# Patient Record
Sex: FEMALE | Race: WHITE | NOT HISPANIC OR LATINO | Employment: FULL TIME | URBAN - METROPOLITAN AREA
[De-identification: names, ages, dates, MRNs, and addresses within clinical notes are randomized per-mention and may not be internally consistent; named-entity substitution may affect disease eponyms.]

---

## 2018-03-09 ENCOUNTER — TRANSCRIBE ORDERS (OUTPATIENT)
Dept: ADMINISTRATIVE | Age: 48
End: 2018-03-09

## 2018-03-09 ENCOUNTER — APPOINTMENT (OUTPATIENT)
Dept: LAB | Age: 48
End: 2018-03-09
Payer: COMMERCIAL

## 2018-03-09 DIAGNOSIS — R03.0 ELEVATED BLOOD PRESSURE READING WITHOUT DIAGNOSIS OF HYPERTENSION: ICD-10-CM

## 2018-03-09 DIAGNOSIS — R03.0 ELEVATED BLOOD PRESSURE READING WITHOUT DIAGNOSIS OF HYPERTENSION: Primary | ICD-10-CM

## 2018-03-09 DIAGNOSIS — E78.5 HYPERLIPIDEMIA, UNSPECIFIED HYPERLIPIDEMIA TYPE: ICD-10-CM

## 2018-03-09 DIAGNOSIS — R50.2 IODIDE FEVER IN THERAPEUTIC USE: ICD-10-CM

## 2018-03-09 DIAGNOSIS — T49.0X5A IODIDE FEVER IN THERAPEUTIC USE: ICD-10-CM

## 2018-03-09 LAB
ALBUMIN SERPL BCP-MCNC: 3.6 G/DL (ref 3.5–5)
ALP SERPL-CCNC: 72 U/L (ref 46–116)
ALT SERPL W P-5'-P-CCNC: 32 U/L (ref 12–78)
ANION GAP SERPL CALCULATED.3IONS-SCNC: 7 MMOL/L (ref 4–13)
AST SERPL W P-5'-P-CCNC: 17 U/L (ref 5–45)
BILIRUB SERPL-MCNC: 0.73 MG/DL (ref 0.2–1)
BUN SERPL-MCNC: 6 MG/DL (ref 5–25)
CALCIUM SERPL-MCNC: 8.9 MG/DL (ref 8.3–10.1)
CHLORIDE SERPL-SCNC: 105 MMOL/L (ref 100–108)
CHOLEST SERPL-MCNC: 189 MG/DL (ref 50–200)
CO2 SERPL-SCNC: 28 MMOL/L (ref 21–32)
CREAT SERPL-MCNC: 0.69 MG/DL (ref 0.6–1.3)
CREAT UR-MCNC: 362 MG/DL
GFR SERPL CREATININE-BSD FRML MDRD: 103 ML/MIN/1.73SQ M
GLUCOSE P FAST SERPL-MCNC: 99 MG/DL (ref 65–99)
HDLC SERPL-MCNC: 67 MG/DL (ref 40–60)
LDLC SERPL CALC-MCNC: 103 MG/DL (ref 0–100)
POTASSIUM SERPL-SCNC: 3.9 MMOL/L (ref 3.5–5.3)
PROT SERPL-MCNC: 7.1 G/DL (ref 6.4–8.2)
PROT UR-MCNC: 28 MG/DL
PROT/CREAT UR: 0.08 MG/G{CREAT} (ref 0–0.1)
SODIUM SERPL-SCNC: 140 MMOL/L (ref 136–145)
TRIGL SERPL-MCNC: 97 MG/DL
TSH SERPL DL<=0.05 MIU/L-ACNC: 2.48 UIU/ML (ref 0.36–3.74)

## 2018-03-09 PROCEDURE — 80061 LIPID PANEL: CPT

## 2018-03-09 PROCEDURE — 84443 ASSAY THYROID STIM HORMONE: CPT

## 2018-03-09 PROCEDURE — 82570 ASSAY OF URINE CREATININE: CPT | Performed by: FAMILY MEDICINE

## 2018-03-09 PROCEDURE — 36415 COLL VENOUS BLD VENIPUNCTURE: CPT

## 2018-03-09 PROCEDURE — 84156 ASSAY OF PROTEIN URINE: CPT | Performed by: FAMILY MEDICINE

## 2018-03-09 PROCEDURE — 80053 COMPREHEN METABOLIC PANEL: CPT

## 2019-04-30 ENCOUNTER — OFFICE VISIT (OUTPATIENT)
Dept: OBGYN CLINIC | Facility: CLINIC | Age: 49
End: 2019-04-30
Payer: COMMERCIAL

## 2019-04-30 VITALS
BODY MASS INDEX: 23.39 KG/M2 | WEIGHT: 132 LBS | HEIGHT: 63 IN | SYSTOLIC BLOOD PRESSURE: 140 MMHG | DIASTOLIC BLOOD PRESSURE: 88 MMHG

## 2019-04-30 DIAGNOSIS — Z01.419 ENCNTR FOR GYN EXAM (GENERAL) (ROUTINE) W/O ABN FINDINGS: ICD-10-CM

## 2019-04-30 DIAGNOSIS — Z12.31 ENCOUNTER FOR SCREENING MAMMOGRAM FOR MALIGNANT NEOPLASM OF BREAST: ICD-10-CM

## 2019-04-30 PROCEDURE — S0610 ANNUAL GYNECOLOGICAL EXAMINA: HCPCS | Performed by: PHYSICIAN ASSISTANT

## 2019-04-30 PROCEDURE — G0145 SCR C/V CYTO,THINLAYER,RESCR: HCPCS | Performed by: PHYSICIAN ASSISTANT

## 2019-04-30 PROCEDURE — 87624 HPV HI-RISK TYP POOLED RSLT: CPT | Performed by: PHYSICIAN ASSISTANT

## 2019-05-02 LAB
HPV HR 12 DNA CVX QL NAA+PROBE: NEGATIVE
HPV16 DNA CVX QL NAA+PROBE: NEGATIVE
HPV18 DNA CVX QL NAA+PROBE: NEGATIVE

## 2019-05-03 LAB
LAB AP GYN PRIMARY INTERPRETATION: NORMAL
Lab: NORMAL

## 2019-07-31 ENCOUNTER — TRANSCRIBE ORDERS (OUTPATIENT)
Dept: ADMINISTRATIVE | Age: 49
End: 2019-07-31

## 2019-07-31 ENCOUNTER — TELEPHONE (OUTPATIENT)
Dept: OBGYN CLINIC | Facility: CLINIC | Age: 49
End: 2019-07-31

## 2019-07-31 ENCOUNTER — APPOINTMENT (OUTPATIENT)
Dept: LAB | Age: 49
End: 2019-07-31
Payer: COMMERCIAL

## 2019-07-31 DIAGNOSIS — Z00.00 ENCOUNTER FOR ANNUAL GENERAL MEDICAL EXAMINATION WITHOUT ABNORMAL FINDINGS IN ADULT: ICD-10-CM

## 2019-07-31 DIAGNOSIS — R03.0 ELEVATED BLOOD PRESSURE READING WITHOUT DIAGNOSIS OF HYPERTENSION: ICD-10-CM

## 2019-07-31 DIAGNOSIS — R03.0 ELEVATED BLOOD PRESSURE READING WITHOUT DIAGNOSIS OF HYPERTENSION: Primary | ICD-10-CM

## 2019-07-31 LAB
ANION GAP SERPL CALCULATED.3IONS-SCNC: 3 MMOL/L (ref 4–13)
BUN SERPL-MCNC: 10 MG/DL (ref 5–25)
CALCIUM SERPL-MCNC: 9.2 MG/DL (ref 8.3–10.1)
CHLORIDE SERPL-SCNC: 108 MMOL/L (ref 100–108)
CHOLEST SERPL-MCNC: 194 MG/DL (ref 50–200)
CO2 SERPL-SCNC: 30 MMOL/L (ref 21–32)
CREAT SERPL-MCNC: 0.64 MG/DL (ref 0.6–1.3)
GFR SERPL CREATININE-BSD FRML MDRD: 104 ML/MIN/1.73SQ M
GLUCOSE P FAST SERPL-MCNC: 99 MG/DL (ref 65–99)
HDLC SERPL-MCNC: 77 MG/DL (ref 40–60)
LDLC SERPL CALC-MCNC: 101 MG/DL (ref 0–100)
NONHDLC SERPL-MCNC: 117 MG/DL
POTASSIUM SERPL-SCNC: 4.1 MMOL/L (ref 3.5–5.3)
SODIUM SERPL-SCNC: 141 MMOL/L (ref 136–145)
TRIGL SERPL-MCNC: 79 MG/DL

## 2019-07-31 PROCEDURE — 80061 LIPID PANEL: CPT

## 2019-07-31 PROCEDURE — 80048 BASIC METABOLIC PNL TOTAL CA: CPT

## 2019-07-31 PROCEDURE — 36415 COLL VENOUS BLD VENIPUNCTURE: CPT

## 2019-07-31 NOTE — TELEPHONE ENCOUNTER
Patient called - state she was told by Carmel Floor that if she had any spotting to call  Please call pt back

## 2019-08-02 NOTE — TELEPHONE ENCOUNTER
Pt returned call and stated lmp was over a year ago and was told by chana to call in so that it noted in her chart if she has any bleeding, pt stated had light spotting, color dark brown red, was using a liner - lasted til 08/01 for 4 days

## 2019-08-06 NOTE — TELEPHONE ENCOUNTER
At her yearly visit in April, she told us she had a period on 8/5/18     Please bring her in for a visit with possible endometrial biopsy - have her premedicate with ibuprofen 600mg with a snack one hour prior to her visit (as long as she can take ibuprofen)

## 2019-08-06 NOTE — TELEPHONE ENCOUNTER
Patient returned call - she is aware that she needs to come in for an OV and she may have EB done  Recommended she take 2-3 ibuprofen an hour before the appt  She is schedueld for 08/16

## 2020-07-01 ENCOUNTER — ANNUAL EXAM (OUTPATIENT)
Dept: OBGYN CLINIC | Facility: CLINIC | Age: 50
End: 2020-07-01
Payer: COMMERCIAL

## 2020-07-01 VITALS
BODY MASS INDEX: 24.45 KG/M2 | SYSTOLIC BLOOD PRESSURE: 138 MMHG | WEIGHT: 138 LBS | HEIGHT: 63 IN | TEMPERATURE: 98.7 F | DIASTOLIC BLOOD PRESSURE: 82 MMHG

## 2020-07-01 DIAGNOSIS — Z12.31 ENCOUNTER FOR SCREENING MAMMOGRAM FOR MALIGNANT NEOPLASM OF BREAST: ICD-10-CM

## 2020-07-01 DIAGNOSIS — Z01.419 ENCNTR FOR GYN EXAM (GENERAL) (ROUTINE) W/O ABN FINDINGS: Primary | ICD-10-CM

## 2020-07-01 DIAGNOSIS — Z12.11 COLON CANCER SCREENING: ICD-10-CM

## 2020-07-01 PROCEDURE — 99396 PREV VISIT EST AGE 40-64: CPT | Performed by: PHYSICIAN ASSISTANT

## 2020-07-01 NOTE — PROGRESS NOTES
Lida Blanton  1970      CC:  Yearly exam    S:  44 y o  female here for yearly exam  Her cycles are spacing out to every 4-8 months  Sexual activity: She is sexually active without pain, bleeding or dryness  Contraception: She uses condoms for contraception  She is struggling with anxiety and sleep and is not treating this  Last Pap 4/30/19 neg/neg  Last Mammo never  Last Colonoscopy never    We reviewed ASCCP guidelines for Pap testing today  Family hx of breast cancer: no  Family hx of ovarian cancer:no  Family hx of colon cancer: no    No current outpatient medications on file    Social History     Socioeconomic History    Marital status: /Civil Union     Spouse name: Not on file    Number of children: Not on file    Years of education: Not on file    Highest education level: Not on file   Occupational History    Not on file   Social Needs    Financial resource strain: Not on file    Food insecurity:     Worry: Not on file     Inability: Not on file    Transportation needs:     Medical: Not on file     Non-medical: Not on file   Tobacco Use    Smoking status: Current Every Day Smoker    Smokeless tobacco: Never Used   Substance and Sexual Activity    Alcohol use: Yes     Frequency: Never     Drinks per session: 1 or 2     Binge frequency: Never    Drug use: Never    Sexual activity: Yes     Partners: Male     Birth control/protection: None, Condom Male   Lifestyle    Physical activity:     Days per week: Not on file     Minutes per session: Not on file    Stress: Not on file   Relationships    Social connections:     Talks on phone: Not on file     Gets together: Not on file     Attends Worship service: Not on file     Active member of club or organization: Not on file     Attends meetings of clubs or organizations: Not on file     Relationship status: Not on file    Intimate partner violence:     Fear of current or ex partner: Not on file     Emotionally abused: Not on file     Physically abused: Not on file     Forced sexual activity: Not on file   Other Topics Concern    Not on file   Social History Narrative    Not on file     Family History   Problem Relation Age of Onset    No Known Problems Mother     Diabetes Son     Liver cancer Maternal Grandfather     No Known Problems Son     Diabetes Maternal Uncle       Past Medical History:   Diagnosis Date    Urinary tract infection     Varicella         Review of Systems   Respiratory: Negative  Cardiovascular: Negative  Gastrointestinal: Negative for constipation and diarrhea  Genitourinary: Negative for difficulty urinating, pelvic pain, vaginal bleeding, vaginal discharge, itching or odor  O:  Blood pressure 138/82, temperature 98 7 °F (37 1 °C), temperature source Tympanic, height 5' 2 99" (1 6 m), weight 62 6 kg (138 lb), last menstrual period 04/07/2020  Patient appears well and is not in distress  Neck is supple without masses  Breasts are symmetrical without mass, tenderness, nipple discharge, skin changes or adenopathy  Abdomen is soft and nontender without masses  External genitals are normal without lesions or rashes  Urethral meatus and urethra are normal  Bladder is normal to palpation  Vagina is normal without discharge or bleeding  Cervix is normal without discharge or lesion  Uterus is normal, mobile, nontender without palpable mass  Adnexa are normal, nontender, without palpable mass  A:  Yearly exam      P:   Pap 2024    Mammo slip provided    Colonoscopy recommended    RTO one year for yearly exam or sooner as needed

## 2020-09-22 ENCOUNTER — TELEPHONE (OUTPATIENT)
Dept: SURGERY | Facility: CLINIC | Age: 50
End: 2020-09-22

## 2020-09-22 DIAGNOSIS — Z12.11 SCREENING FOR COLON CANCER: Primary | ICD-10-CM

## 2020-09-22 NOTE — TELEPHONE ENCOUNTER
Called and spoke to patient regarding her colon cancer screening options  She chose Cologuard  (order entered and faxed)    She is requesting return call from Zulma Bal and/or team - regarding questions/concerns she has about menopause  Thank you

## 2020-09-23 NOTE — TELEPHONE ENCOUNTER
She had no issues when I saw her for her yearly  She needs an appt to address this - preferably with her PCP, as none of this sounds GYN in origin to me

## 2020-09-23 NOTE — TELEPHONE ENCOUNTER
Pt returned call and stated I quote "everything in her lower intestines are messed up and her blood pressure is up and she would like to know if related to menopause can hysterectomy be an option for discussion to solve issues  Pt further more added she is having hot flashes and mood swings that make her angry with dizziness at random times of the day  Please advise

## 2020-10-23 ENCOUNTER — TELEPHONE (OUTPATIENT)
Dept: SURGERY | Facility: CLINIC | Age: 50
End: 2020-10-23

## 2021-03-27 ENCOUNTER — IMMUNIZATIONS (OUTPATIENT)
Dept: FAMILY MEDICINE CLINIC | Facility: HOSPITAL | Age: 51
End: 2021-03-27

## 2021-03-27 DIAGNOSIS — Z23 ENCOUNTER FOR IMMUNIZATION: Primary | ICD-10-CM

## 2021-03-27 PROCEDURE — 91300 SARS-COV-2 / COVID-19 MRNA VACCINE (PFIZER-BIONTECH) 30 MCG: CPT

## 2021-03-27 PROCEDURE — 0001A SARS-COV-2 / COVID-19 MRNA VACCINE (PFIZER-BIONTECH) 30 MCG: CPT

## 2021-04-17 ENCOUNTER — IMMUNIZATIONS (OUTPATIENT)
Dept: FAMILY MEDICINE CLINIC | Facility: HOSPITAL | Age: 51
End: 2021-04-17

## 2021-04-17 DIAGNOSIS — Z23 ENCOUNTER FOR IMMUNIZATION: Primary | ICD-10-CM

## 2021-04-17 PROCEDURE — 91300 SARS-COV-2 / COVID-19 MRNA VACCINE (PFIZER-BIONTECH) 30 MCG: CPT

## 2021-04-17 PROCEDURE — 0002A SARS-COV-2 / COVID-19 MRNA VACCINE (PFIZER-BIONTECH) 30 MCG: CPT

## 2021-07-06 ENCOUNTER — ANNUAL EXAM (OUTPATIENT)
Dept: OBGYN CLINIC | Facility: CLINIC | Age: 51
End: 2021-07-06
Payer: COMMERCIAL

## 2021-07-06 VITALS
BODY MASS INDEX: 25.37 KG/M2 | DIASTOLIC BLOOD PRESSURE: 70 MMHG | SYSTOLIC BLOOD PRESSURE: 128 MMHG | WEIGHT: 143.2 LBS | HEIGHT: 63 IN

## 2021-07-06 DIAGNOSIS — Z01.419 ENCNTR FOR GYN EXAM (GENERAL) (ROUTINE) W/O ABN FINDINGS: Primary | ICD-10-CM

## 2021-07-06 DIAGNOSIS — R10.2 PELVIC PAIN: ICD-10-CM

## 2021-07-06 DIAGNOSIS — Z12.31 ENCOUNTER FOR SCREENING MAMMOGRAM FOR MALIGNANT NEOPLASM OF BREAST: ICD-10-CM

## 2021-07-06 PROBLEM — I10 ESSENTIAL HYPERTENSION: Status: ACTIVE | Noted: 2021-01-08

## 2021-07-06 PROBLEM — I51.9 DIASTOLIC DYSFUNCTION, LEFT VENTRICLE: Status: ACTIVE | Noted: 2020-09-03

## 2021-07-06 PROBLEM — Z72.0 TOBACCO ABUSE: Status: ACTIVE | Noted: 2021-01-08

## 2021-07-06 PROCEDURE — 3008F BODY MASS INDEX DOCD: CPT | Performed by: PHYSICIAN ASSISTANT

## 2021-07-06 PROCEDURE — 99396 PREV VISIT EST AGE 40-64: CPT | Performed by: PHYSICIAN ASSISTANT

## 2021-07-06 RX ORDER — LOSARTAN POTASSIUM 25 MG/1
TABLET ORAL
COMMUNITY
Start: 2021-06-08

## 2021-07-06 RX ORDER — CHLORAL HYDRATE 500 MG
1000 CAPSULE ORAL DAILY
COMMUNITY

## 2021-07-06 NOTE — PROGRESS NOTES
Brian Patino   1970    CC:  Yearly exam    S:  46 y o  female here for yearly exam  She is postmenopausal (LMP 4/2020) and has had no vaginal bleeding  She denies vaginal discharge, itching, odor or dryness  She gets occasional left pelvic cramping discomfort unrelated to bowels or bladder  Sexual activity: She is sexually active without pain, bleeding or dryness  Last Pap: 4/30/19 neg/neg  Last Mammo:  never  Last Colonoscopy:  Cologuard 10/2020 neg      We reviewed ASCCP guidelines for Pap testing  Family hx of breast cancer: no  Family hx of ovarian cancer: no  Family hx of colon cancer: no      Current Outpatient Medications:     calcium-vitamin D (OSCAL) 250-125 MG-UNIT per tablet, Take 1 tablet by mouth daily, Disp: , Rfl:     losartan (COZAAR) 25 mg tablet, , Disp: , Rfl:     Omega-3 Fatty Acids (fish oil) 1,000 mg, Take 1,000 mg by mouth daily, Disp: , Rfl:     SUPER B COMPLEX/C PO, Take by mouth, Disp: , Rfl:   Social History     Socioeconomic History    Marital status: /Civil Union     Spouse name: Not on file    Number of children: Not on file    Years of education: Not on file    Highest education level: Not on file   Occupational History    Not on file   Tobacco Use    Smoking status: Current Every Day Smoker    Smokeless tobacco: Never Used   Vaping Use    Vaping Use: Never used   Substance and Sexual Activity    Alcohol use: Yes    Drug use: Never    Sexual activity: Yes     Partners: Male     Birth control/protection: None, Post-menopausal   Other Topics Concern    Not on file   Social History Narrative    Not on file     Social Determinants of Health     Financial Resource Strain:     Difficulty of Paying Living Expenses:    Food Insecurity:     Worried About Running Out of Food in the Last Year:     [address] in the Last Year:    Transportation Needs:     Lack of Transportation (Medical):      Lack of Transportation (Non-Medical):    Physical Activity:     Days of Exercise per Week:     Minutes of Exercise per Session:    Stress:     Feeling of Stress :    Social Connections:     Frequency of Communication with Friends and Family:     Frequency of Social Gatherings with Friends and Family:     Attends Protestant Services:     Active Member of Clubs or Organizations:     Attends Club or Organization Meetings:     Marital Status:    Intimate Partner Violence:     Fear of Current or Ex-Partner:     Emotionally Abused:     Physically Abused:     Sexually Abused:      Family History   Problem Relation Age of Onset    No Known Problems Mother     Diabetes Son     Liver cancer Maternal Grandfather     No Known Problems Son     Diabetes Maternal Uncle      Past Medical History:   Diagnosis Date    Urinary tract infection     Varicella         Review of Systems   Respiratory: Negative  Cardiovascular: Negative  Gastrointestinal: Negative for constipation and diarrhea  Genitourinary: Negative for difficulty urinating, pelvic pain, vaginal bleeding, vaginal discharge, itching or odor  O:  Blood pressure 128/70, height 5' 2 99" (1 6 m), weight 65 kg (143 lb 3 2 oz), last menstrual period 04/07/2020  Patient appears well and is not in distress  Neck is supple without masses  Breasts are symmetrical without mass, tenderness, nipple discharge, skin changes or adenopathy  Abdomen is soft and nontender without masses  External genitals are normal without lesions or rashes  Urethral meatus and urethra are normal  Bladder is normal to palpation  Vagina is normal without discharge or bleeding  Cervix is normal without discharge or lesion  Uterus is normal, mobile, nontender without palpable mass  Adnexa are normal, nontender, without palpable mass       A:   Yearly exam     Left pelvic discomfort - suspect GI origin    P:   Pap 2024   Mammo slip given   Colonoscopy 2023   Check pelvic US, if neg, refer to GI     RTO one year for yearly exam or sooner as needed

## 2021-07-15 ENCOUNTER — HOSPITAL ENCOUNTER (OUTPATIENT)
Dept: RADIOLOGY | Age: 51
Discharge: HOME | End: 2021-07-15
Payer: COMMERCIAL

## 2021-07-15 DIAGNOSIS — R10.2 PELVIC PAIN: ICD-10-CM

## 2021-07-15 PROCEDURE — 76856 US EXAM PELVIC COMPLETE: CPT

## 2021-07-15 PROCEDURE — 76830 TRANSVAGINAL US NON-OB: CPT

## 2021-09-08 ENCOUNTER — TELEPHONE (OUTPATIENT)
Dept: GYNECOLOGY | Facility: CLINIC | Age: 51
End: 2021-09-08

## 2021-11-30 ENCOUNTER — HOSPITAL ENCOUNTER (OUTPATIENT)
Dept: RADIOLOGY | Age: 51
Discharge: HOME | End: 2021-11-30
Payer: COMMERCIAL

## 2021-11-30 VITALS — HEIGHT: 63 IN | BODY MASS INDEX: 25.34 KG/M2 | WEIGHT: 143 LBS

## 2021-11-30 DIAGNOSIS — Z12.31 ENCOUNTER FOR SCREENING MAMMOGRAM FOR MALIGNANT NEOPLASM OF BREAST: ICD-10-CM

## 2021-11-30 PROCEDURE — 77067 SCR MAMMO BI INCL CAD: CPT

## 2021-11-30 PROCEDURE — 77063 BREAST TOMOSYNTHESIS BI: CPT

## 2021-12-06 ENCOUNTER — OFFICE VISIT (OUTPATIENT)
Dept: GYNECOLOGY | Facility: CLINIC | Age: 51
End: 2021-12-06
Payer: COMMERCIAL

## 2021-12-06 VITALS
HEART RATE: 63 BPM | BODY MASS INDEX: 24.98 KG/M2 | DIASTOLIC BLOOD PRESSURE: 78 MMHG | WEIGHT: 141 LBS | SYSTOLIC BLOOD PRESSURE: 118 MMHG | HEIGHT: 63 IN

## 2021-12-06 DIAGNOSIS — Z78.0 MENOPAUSE: Primary | ICD-10-CM

## 2021-12-06 DIAGNOSIS — F17.200 SMOKING: ICD-10-CM

## 2021-12-06 PROCEDURE — 99202 OFFICE O/P NEW SF 15 MIN: CPT | Performed by: OBSTETRICS & GYNECOLOGY

## 2021-12-06 RX ORDER — SACCHAROMYCES BOULARDII 250 MG
250 CAPSULE ORAL 2 TIMES DAILY
COMMUNITY
End: 2022-07-08 | Stop reason: ALTCHOICE

## 2021-12-06 RX ORDER — VANCOMYCIN HYDROCHLORIDE 125 MG/1
CAPSULE ORAL
COMMUNITY
Start: 2021-11-04 | End: 2022-07-08 | Stop reason: ALTCHOICE

## 2022-03-01 ENCOUNTER — TELEPHONE (OUTPATIENT)
Dept: GYNECOLOGY | Facility: CLINIC | Age: 52
End: 2022-03-01

## 2022-07-08 ENCOUNTER — ANNUAL EXAM (OUTPATIENT)
Dept: OBGYN CLINIC | Facility: CLINIC | Age: 52
End: 2022-07-08
Payer: COMMERCIAL

## 2022-07-08 VITALS
WEIGHT: 145.8 LBS | BODY MASS INDEX: 24.89 KG/M2 | HEIGHT: 64 IN | DIASTOLIC BLOOD PRESSURE: 78 MMHG | SYSTOLIC BLOOD PRESSURE: 122 MMHG

## 2022-07-08 DIAGNOSIS — Z01.419 ENCNTR FOR GYN EXAM (GENERAL) (ROUTINE) W/O ABN FINDINGS: ICD-10-CM

## 2022-07-08 DIAGNOSIS — Z12.31 ENCOUNTER FOR SCREENING MAMMOGRAM FOR MALIGNANT NEOPLASM OF BREAST: ICD-10-CM

## 2022-07-08 PROBLEM — A04.72 C. DIFFICILE DIARRHEA: Status: ACTIVE | Noted: 2022-03-01

## 2022-07-08 PROBLEM — A04.72 C. DIFFICILE DIARRHEA: Status: RESOLVED | Noted: 2022-03-01 | Resolved: 2022-07-08

## 2022-07-08 PROCEDURE — 0503F POSTPARTUM CARE VISIT: CPT | Performed by: PHYSICIAN ASSISTANT

## 2022-07-08 PROCEDURE — 99396 PREV VISIT EST AGE 40-64: CPT | Performed by: PHYSICIAN ASSISTANT

## 2022-07-08 RX ORDER — AMLODIPINE BESYLATE 2.5 MG/1
2.5 TABLET ORAL DAILY
COMMUNITY
Start: 2022-05-03

## 2022-07-08 NOTE — PROGRESS NOTES
Manju Prince   1970    CC:  Yearly exam    S:  45 y o  female here for yearly exam  She is postmenopausal (4/2020) and has had no vaginal bleeding  She denies vaginal discharge, itching, odor or dryness  Sexual activity: She is sexually active with some dryness but no bleeding       Last Pap: 4/30/19 neg/neg  Last Mammo: 11/30/21 neg   Last Colonoscopy: 12/20/21      We reviewed [facility] guidelines for Pap testing       Family hx of breast cancer: no  Family hx of ovarian cancer: no  Family hx of colon cancer: no      Current Outpatient Medications:     calcium-vitamin D (OSCAL) 250-125 MG-UNIT per tablet, Take 1 tablet by mouth daily, Disp: , Rfl:     losartan (COZAAR) 25 mg tablet, , Disp: , Rfl:     Omega-3 Fatty Acids (fish oil) 1,000 mg, Take 1,000 mg by mouth daily, Disp: , Rfl:     SUPER B COMPLEX/C PO, Take by mouth, Disp: , Rfl:     amLODIPine (NORVASC) 2 5 mg tablet, Take 2 5 mg by mouth daily, Disp: , Rfl:   Social History     Socioeconomic History    Marital status: /Civil Union     Spouse name: Not on file    Number of children: Not on file    Years of education: Not on file    Highest education level: Not on file   Occupational History    Not on file   Tobacco Use    Smoking status: Current Every Day Smoker     Packs/day: 0 50     Years: 25 00     Pack years: 12 50     Types: Cigarettes    Smokeless tobacco: Never Used   Vaping Use    Vaping Use: Never used   Substance and Sexual Activity    Alcohol use:  Yes    Drug use: Never    Sexual activity: Yes     Partners: Male     Birth control/protection: None, Post-menopausal   Other Topics Concern    Not on file   Social History Narrative    Not on file     Social Determinants of Health     Financial Resource Strain: Not on file   Food Insecurity: Not on file   Transportation Needs: Not on file   Physical Activity: Not on file   Stress: Not on file   Social Connections: Not on file   Intimate Partner Violence: Not on file Housing Stability: Not on file     Family History   Problem Relation Age of Onset    Glaucoma Mother     No Known Problems Father     Diabetes Son     Diabetes type I Son     Liver cancer Maternal Grandfather     No Known Problems Son     Diabetes Maternal Uncle     Throat cancer Maternal Grandmother     Arthritis Family      Past Medical History:   Diagnosis Date    C  difficile colitis     Urinary tract infection     Varicella         Review of Systems   Respiratory: Negative  Cardiovascular: Negative  Gastrointestinal: Negative for constipation and diarrhea  Genitourinary: Negative for difficulty urinating, pelvic pain, vaginal bleeding, vaginal discharge, itching or odor  O:  Blood pressure 122/78, height 5' 4 17" (1 63 m), weight 66 1 kg (145 lb 12 8 oz), last menstrual period 04/07/2020  Patient appears well and is not in distress  Neck is supple without masses  Breasts are symmetrical without mass, tenderness, nipple discharge, skin changes or adenopathy  Abdomen is soft and nontender without masses  External genitals are normal without lesions or rashes  Urethral meatus and urethra are normal  Bladder is normal to palpation  Vagina is normal without discharge or bleeding  Cervix is normal without discharge or lesion  Uterus is normal, mobile, nontender without palpable mass  Adnexa are normal, nontender, without palpable mass  A:   Yearly exam      P:   Pap 2024  Mammo slip given   Colonoscopy due 2026   Samples of Uberlube silicone lubricant given    RTO one year for yearly exam or sooner as needed

## 2023-11-22 ENCOUNTER — ANNUAL EXAM (OUTPATIENT)
Dept: OBGYN CLINIC | Facility: CLINIC | Age: 53
End: 2023-11-22
Payer: COMMERCIAL

## 2023-11-22 VITALS
WEIGHT: 146 LBS | HEIGHT: 63 IN | BODY MASS INDEX: 25.87 KG/M2 | SYSTOLIC BLOOD PRESSURE: 110 MMHG | DIASTOLIC BLOOD PRESSURE: 80 MMHG

## 2023-11-22 DIAGNOSIS — Z12.31 ENCOUNTER FOR SCREENING MAMMOGRAM FOR MALIGNANT NEOPLASM OF BREAST: ICD-10-CM

## 2023-11-22 DIAGNOSIS — Z01.419 ENCNTR FOR GYN EXAM (GENERAL) (ROUTINE) W/O ABN FINDINGS: Primary | ICD-10-CM

## 2023-11-22 PROCEDURE — G0476 HPV COMBO ASSAY CA SCREEN: HCPCS | Performed by: PHYSICIAN ASSISTANT

## 2023-11-22 PROCEDURE — 99396 PREV VISIT EST AGE 40-64: CPT | Performed by: PHYSICIAN ASSISTANT

## 2023-11-22 PROCEDURE — G0145 SCR C/V CYTO,THINLAYER,RESCR: HCPCS | Performed by: PHYSICIAN ASSISTANT

## 2023-11-22 NOTE — PROGRESS NOTES
Assessment   52 y.o. [id number] presenting for annual exam.     Plan   Diagnoses and all orders for this visit:    Encntr for gyn exam (general) (routine) w/o abn findings  -     Liquid-based pap, screening    Encounter for screening mammogram for malignant neoplasm of breast  -     Mammo screening bilateral w 3d & cad; Future        Pap collected today  Mammo slip given    Colonoscopy up to date    Perineal hygiene reviewed. Weight bearing exercises minium of 150 mins/weekly advised. Kegel exercises recommended. SBE encouraged, A yearly mammogram is recommended for breast cancer screening starting at age 36. ASCCP guidelines reviewed. Calcium/ Vit D dietary requirements discussed. Advised to call with any issues, all concerns & questions addressed. See provided information in your after visit summary     F/U Annually and PRN    Results will be released to [facility], if abnormal will call or message to review and discuss treatment plan.     __________________________________________________________________    Subjective     Zohra Lopez is a 52 y.o. [id number] presenting for annual exam. She is without complaint. SCREENING  Last Pap: 2019 neg/neg  Last Mammo: 2021 birads 1  Last Colonoscopy: 2021; 5 year recall      GYN  , no VB    Sexually active: Yes - single partner - male    Hx Abnormal pap: denies  We reviewed ASCCP guidelines for Pap testing today. Denies vaginal discharge, itching, odor, dyspareunia, pelvic pain and vulvar/vaginal symptoms      OB           Complaints: denies   Denies urgency, frequency, hematuria, leakage / change in stream, difficulty urinating.        BREAST  Complaints: denies   Denies: breast lump, breast tenderness, nipple discharge, skin color change, and skin lesion(s)  Personal hx: none      Pertinent Family Hx:   Family hx of breast cancer: no  Family hx of ovarian cancer: no  Family hx of colon cancer: no      GENERAL  PMH reviewed/updated and is as below. Patient does follow with a PCP. SOCIAL  Smokin.25 ppd  Alcohol: social  Drug: no  Occupation:  - [employer]      Past Medical History:   Diagnosis Date    C. difficile colitis     Urinary tract infection     Varicella        Past Surgical History:   Procedure Laterality Date     SECTION      COLONOSCOPY  2021    WISDOM TOOTH EXTRACTION           Current Outpatient Medications:     amLODIPine (NORVASC) 2.5 mg tablet, Take 2.5 mg by mouth daily, Disp: , Rfl:     calcium-vitamin D (OSCAL) 250-125 MG-UNIT per tablet, Take 1 tablet by mouth daily, Disp: , Rfl:     losartan (COZAAR) 25 mg tablet, , Disp: , Rfl:     Omega-3 Fatty Acids (fish oil) 1,000 mg, Take 1,000 mg by mouth daily, Disp: , Rfl:     SUPER B COMPLEX/C PO, Take by mouth, Disp: , Rfl:     Allergies   Allergen Reactions    Aspirin        Social History     Socioeconomic History    Marital status: /Civil Union     Spouse name: Not on file    Number of children: Not on file    Years of education: Not on file    Highest education level: Not on file   Occupational History    Not on file   Tobacco Use    Smoking status: Every Day     Packs/day: 0.25     Years: 20.00     Total pack years: 5.00     Types: Cigarettes    Smokeless tobacco: Never   Vaping Use    Vaping Use: Never used   Substance and Sexual Activity    Alcohol use:  Yes     Alcohol/week: 4.0 standard drinks of alcohol     Types: 4 Glasses of wine per week    Drug use: Never    Sexual activity: Yes     Partners: Male     Birth control/protection: None   Other Topics Concern    Not on file   Social History Narrative    Not on file     Social Determinants of Health     Financial Resource Strain: Not on file   Food Insecurity: Not on file   Transportation Needs: Not on file   Physical Activity: Not on file   Stress: Not on file   Social Connections: Not on file   Intimate Partner Violence: Not on file   Housing Stability: Not on file       Review of Systems     ROS:  Constitutional: Negative for fatigue and unexpected weight change. Respiratory: Negative for cough and shortness of breath. Cardiovascular: Negative for chest pain and palpitations. Gastrointestinal: Negative for abdominal pain and change in bowel habits  Breasts:  Negative, other than as noted above. Genitourinary: Negative, other than as noted above. Psychiatric: Negative for mood difficulties. Objective      /80 (BP Location: Left arm, Patient Position: Sitting, Cuff Size: Standard)   Ht 5' 3" (1.6 m)   Wt 66.2 kg (146 lb)   LMP 04/07/2020   BMI 25.86 kg/m²     Physical Examination:    Patient appears well and is not in distress  Neck is supple without masses, no cervical or supraclavicular lymphadenopathy  Cardiovascular: regular rate and rhythm; no murmurs  Lungs: clear to auscultation bilaterally; no wheezes  Breasts are symmetrical without mass, tenderness, nipple discharge, skin changes or adenopathy. Abdomen is soft and nontender without masses. External genitals are normal without lesions or rashes. Urethral meatus and urethra are normal  Bladder is normal to palpation  Vagina is normal without discharge or bleeding. Cervix is normal without discharge or lesion. Uterus is normal, mobile, nontender without palpable mass. Adnexa are normal, nontender, without palpable mass.

## 2023-11-22 NOTE — PROGRESS NOTES
Patient presents for a routine annual visit  PMB - none  Last Pap Smear- 2019  Pap today   Mammogram- 2021  Referral given   Colonoscopy- 2021   5 year recall   Cologard - 10/14/2020    smoker  Currently sexually active -one partner has some pain and dryness - uses lubricants   No family history of uterine, ovarian, cervical or breast cancer  No concerns/questions for today's visit

## 2023-11-30 LAB
LAB AP GYN PRIMARY INTERPRETATION: NORMAL
Lab: NORMAL

## 2024-07-01 DIAGNOSIS — Z00.6 ENCOUNTER FOR EXAMINATION FOR NORMAL COMPARISON OR CONTROL IN CLINICAL RESEARCH PROGRAM: ICD-10-CM

## 2024-10-15 ENCOUNTER — TELEPHONE (OUTPATIENT)
Dept: OTHER | Facility: HOSPITAL | Age: 54
End: 2024-10-15

## 2024-10-15 DIAGNOSIS — R89.8 ABNORMAL GENETIC TEST: Primary | ICD-10-CM

## 2024-10-15 NOTE — TELEPHONE ENCOUNTER
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 She would like a referral to a genetic counselor.

## 2024-10-16 ENCOUNTER — TELEPHONE (OUTPATIENT)
Dept: GENETICS | Facility: CLINIC | Age: 54
End: 2024-10-16

## 2024-10-16 NOTE — TELEPHONE ENCOUNTER
LVM asking Soha to please call [phone] to discuss her helix genetic test results.    Received notification from bedside RN about patient with regards to: increasing agitation, keeps trying to get out of bed and walk in the hallway, unable to be redirected  VS: /54, HR 58, RR 18, O2 sat 97% on RA    Intervention given:   - Atrax 25 mg PO x 1 dose

## 2024-10-22 ENCOUNTER — TELEPHONE (OUTPATIENT)
Dept: GENETICS | Facility: CLINIC | Age: 54
End: 2024-10-22

## 2024-10-22 NOTE — TELEPHONE ENCOUNTER
LVM asking Soha to please [facility] [phone] option 3 to discuss her [employer] genetic test results at her earliest convenience.

## 2024-10-23 ENCOUNTER — DOCUMENTATION (OUTPATIENT)
Dept: GENETICS | Facility: CLINIC | Age: 54
End: 2024-10-23

## 2024-10-25 ENCOUNTER — OFFICE VISIT (OUTPATIENT)
Dept: GENETICS | Facility: CLINIC | Age: 54
End: 2024-10-25

## 2024-10-25 DIAGNOSIS — Z15.01 BRCA2 POSITIVE: Primary | ICD-10-CM

## 2024-10-25 DIAGNOSIS — Z15.09 BRCA2 POSITIVE: Primary | ICD-10-CM

## 2024-10-25 NOTE — PROGRESS NOTES
Post-Test Genetic Counseling Consult Note    Patient Name: Soha Clifford   /Age: 1970/54 y.o.    Date of Service: 10/25/2024  Genetic Counselor: Lisa Perdomo MS, [facility] [place]  Interpretation Services: None  Location: Telephone consult   Length of Visit: 30 Minutes    Soha presents today for a consult regarding her [facility] test results.    Genetic Testing History:     Report Date: 10/14/2024     Test: [facility] Molecular Screen (11 genes): BRCA1, BRCA2, MLH1, MSH2, MSH6, PMS2, EPCAM, APOB, LDLR, LDLRAP1, and PCSK9       Result: Positive     BRCA2 [id number]); Heterozygous; Pathogenic      Relevant Family History   Patient reports non-Ashkenazi Hindu ancestry.     Maternal Family History:  Grandfather: liver cancer; WWII vet/malaria exposure (d.56); Soha confirmed this cancer is of liver primary and was not pancreatic primary with liver mets   Grandmother: ? Cancer diagnosed in 70s, no treatment (d.73)  Great-grandmother (grandmother's side): ovarian cancer diagnosed in 70s (d.70s)    Paternal Family History:  Soha had limited contact with her biological father and there is no information regarding their family history and structure     Please refer to the scanned pedigree in the Media Tab for a complete family history     *All history is reported as provided by the patient; records are not available for review, except where indicated.     [facility] Result:  Soha underwent genetic testing through the [facility] community health research program. As a part of this program, 11 genes associated with hereditary breast and ovarian cancer (HBOC) syndrome, Duval syndrome and familial hypercholesterolemia (FH) were tested. Soha's result returned positive for a pathogenic variant in the BRCA2 gene. During today's visit, Soha and I reviewed the cancer risks associated with her result, and the recommendations for screening and management.    Assessment:  The BRCA2 gene is  associated with autosomal dominant hereditary breast and ovarian cancer (HBOC) syndrome (MedGen UID: [id number]) and autosomal recessive Fanconi anemia.      This result is consistent with hereditary breast and ovarian cancer (HBOC) syndrome.      Hereditary breast and ovarian cancer (HBOC) syndrome  Women with a single BRCA2 pathogenic variant have approximately a 55-70% lifetime risk of breast cancer.  The risk for a second primary breast cancer within 20 years of the first diagnosis is between 25%. In premenopausal women, the risk for a second breast cancer within 15 years of the first diagnosis is >20%.       Women also have up to a 17-27% lifetime risk for ovarian, fallopian tube, or peritoneal cancer to age 70.      Men with a BRCA2 pathogenic variant have a 1.8-7.1% risk for breast cancer and a 19-61% risk for prostate cancer.      Men and women with a BRCA2 pathogenic variant have up to a 5-10% lifetime risk for pancreatic cancer. They also have an elevated risk for melanoma although the exact lifetime risk is not known.      Reproductive Risks:  If an individual and their partner both have a pathogenic variant in one copy of the BRCA2 gene, there is a 25% chance that a child would inherit a pathogenic variant in both copies of the BRCA2 gene.  Pathogenic variants in both copies of the BRCA2 gene cause a more severe genetic condition called Fanconi anemia.      Fanconi anemia is characterized by bone marrow failure, short stature, abnormal skin pigmentation, developmental delay and malformations of the thumbs, skeletal and central nervous systems.  Individuals with Fanconi anemia also have an increased risk for leukemia and early onset solid tumors.      Relatives of reproductive age may consider testing for the familial variant for reproductive purposes. We recommend that any of these relatives who test positive consider testing their partners. Carrier couples can consult a prenatal genetic counselor to discuss  their reproductive options.     Risks and Testing for Family Members:  This test result may help clarify the risk for other family members to develop cancer. There is a 50% chance all first-degree relatives (parents, siblings and children) inherited the BRCA2 pathogenic variant.  Other relatives such as aunts, uncles and cousins may also be at risk.      Since it is not known with one-hundred percent certainty which side of the family this BRCA2 pathogenic variant came from, we recommend that Soha share this test results with extended family members from both sides of her family.   Testing is not recommended for relatives under the age of 18, as there are no childhood cancer risks known to be associated with one BRCA2 pathogenic variant.     If Soha's relatives have any questions or are interested in testing they can reach out to the main Genetics number at [phone] for additional information.     Management:  Management guidelines for individuals with pathogenic and likely pathogenic BRCA2 variants have been developed by the National Comprehensive Cancer Network.  Please refer to the current NCCN guidelines for the most up to date guidelines (https://www.nccn.org/guidelines/category_2).  The recommendations listed below are specific to Soha and are are based on recommendations in the the NCCN guidelines as of 10/25/24.  These recommendations are subject to change over time and the newest guidelines should be referenced for the most up to date recommendations.       Plan:  WOMEN  Breast Cancer Screening  -Breast awareness starting at age 18 y  -Clinical breast exam every 6-12 mo, starting at age 25 y     -Age 25-29 y, annual breast MRI screening with and without contrast (or mammogram only if MRI is unavailable) or individualized based on family history if a breast cancer diagnosis before 30 is present.    -Age 30-75y, annual mammogram and breast MRI screening with contrast    -Age ?75 y, management  should be considered on an individual basis.    -Discuss option of risk-reducing mastectomy    -For women with a BRCA pathogenic/likely pathogenic variant who are treated for breast cancer and have not had a bilateral mastectomy, screening with annual mammogram and breast MRI should continue as described above.    Gynecologic Cancer Screening  Ovarian Cancer  -Recommend risk-reducing salpingo-oophorectomy (RRSO), typically between 35 and 40y, and upon completion of child bearing. See Risk-Reducing Salpingo-Oophorectomy (RRSO) Protocol in NCCN Guidelines for Ovarian Cancer - Principles of Surgery.    -Because onset of ovarian cancer in individuals with BRCA2 pathogenic/likely pathogenic variants is an average 8-10 years later than individuals with pathogenic/likely pathogenic BRCA1 variants it's reasonable to delay RRSO for management of ovarian cancer risk until 40-45y in patients with BRCA2 pathogenic/likely pathogenic variants unless age at diagnosis in the family warrants earlier age for consideration of prophylactic surgery.    -Salpingectomy alone is not the standard of care for risk reduction, although clinical trials of interval salpingectomy and delayed oophorectomy are ongoing. The concern for risk-reducing salpingectomy alone is that women are still at risk for developing ovarian cancer. In addition, in premenopausal women, oophorectomy likely reduces the risk of developing breast cancer but the magnitude is uncertain and may be gene-specific.    We offered a referral to gynecologic oncology but Soha declined. She would prefer to discuss her options with Melissa Lacy PA-C.     Skin Cancer Screening  -No specific screening guidelines exist for melanoma, but general melanoma risk management is appropriate, such as annual full-body skin examination and minimizing UV exposure.    Pancreatic Cancer Screening:  Per current NCCN Guidelines, all individuals with a BRCA2 mutation can consider pancreatic cancer  screening regardless of a family history of pancreatic cancer. Pancreatic cancer screening typically begins at age 50 (or 10 years younger than the earliest exocrine pancreatic cancer diagnosis in the family, whichever is earlier) and includes contrast-enhanced MRI/MRCP (magnetic resonance cholangiopancreatography) and/or endoscopic ultrasound (EU).     Other Screening:  There are no additional recommendations based on Soha's result.  She should continue cancer screening and medical management as clinically indicated and as determined appropriate by her healthcare providers.    Additional Information:  A healthy lifestyle will improve overall health and reduce risk for illness. Eating a healthy diet and exercising for 4 hours per week is recommended. Both diet and exercise have been shown to help maintain a healthy weight. Postmenopausal women who are overweight are at higher risk for breast cancer. Moderate to heavy alcohol use can increase the risk for some cancers. Smoking cigarettes can also increase risk for breast, lung, prostate, pancreatic and other cancers.      Additional Testing:  Soha and I reviewed that the testing performed in this study assessed for 7 cancer risk-related genes. Additional cancer risk genes are available for diagnostic testing. Based on Soha's reported personal history she does not meet additional criteria, and is not interested in further testing. I provided my office's contact information should there be any future questions or interest in pursuing additional testing.    Plan:  Referral placed to breast specialist in surgical oncology, GI, and dermatology.     Positive Result: Soha was strongly encouraged to follow up on with our office on an annual basis to review the most up to date guidelines as recommendations are subject to change over time.

## 2024-10-28 ENCOUNTER — TELEPHONE (OUTPATIENT)
Dept: HEMATOLOGY ONCOLOGY | Facility: CLINIC | Age: 54
End: 2024-10-28

## 2024-10-28 PROBLEM — Z15.01 BRCA2 POSITIVE: Status: ACTIVE | Noted: 2024-10-28

## 2024-10-28 PROBLEM — Z15.09 BRCA2 POSITIVE: Status: ACTIVE | Noted: 2024-10-28

## 2024-10-28 NOTE — TELEPHONE ENCOUNTER
Referral to Surgical Oncology received.  Chart reviewed by  for Surgical oncology at this time.       Diagnosis:   Diagnosis   Z15.01, Z15.09 (ICD-10-CM) - BRCA2 positive     After review of chart, instructions for scheduling added to referral and sent to be scheduled as advised.

## 2024-12-10 PROBLEM — Z91.89 AT HIGH RISK FOR BREAST CANCER: Status: ACTIVE | Noted: 2024-12-10

## 2025-01-03 ENCOUNTER — TELEPHONE (OUTPATIENT)
Dept: SURGICAL ONCOLOGY | Facility: CLINIC | Age: 55
End: 2025-01-03

## 2025-01-03 NOTE — TELEPHONE ENCOUNTER
Called patient and left a message to call the office back and let us know if she would like to reschedule her appointment with Dr. Castellon.

## 2025-01-21 ENCOUNTER — ANNUAL EXAM (OUTPATIENT)
Dept: OBGYN CLINIC | Facility: CLINIC | Age: 55
End: 2025-01-21
Payer: COMMERCIAL

## 2025-01-21 VITALS
BODY MASS INDEX: 25.54 KG/M2 | HEIGHT: 64 IN | WEIGHT: 149.6 LBS | SYSTOLIC BLOOD PRESSURE: 120 MMHG | DIASTOLIC BLOOD PRESSURE: 80 MMHG

## 2025-01-21 DIAGNOSIS — R01.1 HEART MURMUR: ICD-10-CM

## 2025-01-21 DIAGNOSIS — Z91.89 AT HIGH RISK FOR BREAST CANCER: ICD-10-CM

## 2025-01-21 DIAGNOSIS — Z15.01 BRCA2 POSITIVE: ICD-10-CM

## 2025-01-21 DIAGNOSIS — Z12.31 ENCOUNTER FOR SCREENING MAMMOGRAM FOR MALIGNANT NEOPLASM OF BREAST: ICD-10-CM

## 2025-01-21 DIAGNOSIS — Z01.419 WELL WOMAN EXAM WITH ROUTINE GYNECOLOGICAL EXAM: Primary | ICD-10-CM

## 2025-01-21 DIAGNOSIS — Z15.09 BRCA2 POSITIVE: ICD-10-CM

## 2025-01-21 PROCEDURE — 99396 PREV VISIT EST AGE 40-64: CPT | Performed by: PHYSICIAN ASSISTANT

## 2025-01-21 NOTE — PROGRESS NOTES
Assessment   54 y.o.  presenting for annual exam.     Plan   Diagnoses and all orders for this visit:    Well woman exam with routine gynecological exam    Encounter for screening mammogram for malignant neoplasm of breast  -     Mammo screening bilateral w 3d and cad; Future    BRCA2 positive  -     MRI breast bilateral w and wo contrast w cad; Future    At high risk for breast cancer  -     MRI breast bilateral w and wo contrast w cad; Future    Heart murmur  -     Echo complete congenital; Future         Postmenopausal symptoms - Considering consult with [facility]. Discussed option of in network hormonal consult. Encouraged her to consider non-hormonal options such as SSRIs/SNRIs, Veozah, and OTC supplements. Discussed the risks of Estrogen-containing medications/pellets given that she is BRCA2+ and smokes about 1/2 pack/day.     BRCA2+- Mammo slip given - patient encouraged to schedule mammogram ASAP due to BRCA2+ on genetic testing last year. Also given slip for MRI breast to be done 6 months after MRI. Referral to breast specialists in surgical oncology was given. Patient was encouraged to make an appointment to discuss her options and so she can receive clinical breast exams every 6 months.     Heart murmur - Slight murmur heard on physical exam today in office, last Echo was in  which was WNL. Echo slip was given to be completed in the near future.     Pap up to date   Colonoscopy due next year () per 5 year recall    Perineal hygiene reviewed. Weight bearing exercises minium of 150 mins/weekly advised. Kegel exercises recommended.   SBE encouraged, A yearly mammogram is recommended for breast cancer screening starting at age 40. ASCCP guidelines reviewed. Calcium/ Vit D dietary requirements discussed.   Advised to call with any issues, all concerns & questions addressed.   See provided information in your after visit summary     F/U Annually and PRN    Results will be released to  [facility], if abnormal will call or message to review and discuss treatment plan.     __________________________________________________________________    Mar Clifford is a 54 y.o.  presenting for annual exam. She is without complaint and does not want STD testing today.     She has some questions about the [facility] clinic. Says she is having some anxiety, tension headaches, stress, hot flashes. Says her hot flashes are sometimes very bad, but are not always severe. Has not taken any medications or OTC supplements other than Super B-complex vitamins. Says a friend of hers has gone to the clinic and had success.    Received genetic testing last year that showed that patient is positive for BRCA 2. She has no known family history of +BRCA2 or breast cancer. She had an appointment with [facility] [place] on 10/25/2024 where they discussed important follow up diagnostic tests and management. She seemed disinterested in meeting with the breast specialist in surgical oncology given that she has no issues at the moment.     SCREENING  Last Pap: 2023 neg/neg  Last Mammo: 2021 birads 1  Last Colonoscopy: 2021; 5 year recall      GYN  , no VB    Sexually active: Yes - single partner - male    Hx Abnormal pap: denies  We reviewed ASCCP guidelines for Pap testing today.    Denies vaginal discharge, itching, odor, dyspareunia, pelvic pain and vulvar/vaginal symptoms      OB           Complaints: denies   Denies urgency, frequency, hematuria, leakage / change in stream, difficulty urinating.       BREAST  Complaints: denies   Denies: breast lump, breast tenderness, nipple discharge, skin color change, and skin lesion(s)  Personal hx: BRCA2+      Pertinent Family Hx: Limited contact with biological father  Family hx of breast cancer: no  Family hx of ovarian cancer: no  Family hx of colon cancer: no      GENERAL  PMH reviewed/updated and is as  below.   Patient does follow with a PCP.    SOCIAL  Smokin/4 - 1/2 pack/day   Alcohol: None currently   Drug: Occasional THC   Occupation: [employer], on break right now      Past Medical History:   Diagnosis Date    BRCA positive     C. difficile colitis     Urinary tract infection     Varicella        Past Surgical History:   Procedure Laterality Date     SECTION      COLONOSCOPY  2021    WISDOM TOOTH EXTRACTION           Current Outpatient Medications:     amLODIPine (NORVASC) 2.5 mg tablet, Take 2.5 mg by mouth daily, Disp: , Rfl:     calcium-vitamin D (OSCAL) 250-125 MG-UNIT per tablet, Take 1 tablet by mouth daily, Disp: , Rfl:     losartan (COZAAR) 25 mg tablet, , Disp: , Rfl:     Omega-3 Fatty Acids (fish oil) 1,000 mg, Take 1,000 mg by mouth daily, Disp: , Rfl:     SUPER B COMPLEX/C PO, Take by mouth, Disp: , Rfl:     Allergies   Allergen Reactions    Aspirin        Social History     Socioeconomic History    Marital status: /Civil Union     Spouse name: Not on file    Number of children: Not on file    Years of education: Not on file    Highest education level: Not on file   Occupational History    Not on file   Tobacco Use    Smoking status: Every Day     Current packs/day: 0.25     Average packs/day: 0.3 packs/day for 20.0 years (5.0 ttl pk-yrs)     Types: Cigarettes    Smokeless tobacco: Never   Vaping Use    Vaping status: Never Used   Substance and Sexual Activity    Alcohol use: Yes     Alcohol/week: 4.0 standard drinks of alcohol     Types: 4 Glasses of wine per week     Comment: occasionally    Drug use: Never    Sexual activity: Yes     Partners: Male     Birth control/protection: None   Other Topics Concern    Not on file   Social History Narrative    Not on file     Social Drivers of Health     Financial Resource Strain: Not on file   Food Insecurity: Not on file   Transportation Needs: Not on file   Physical Activity: Not on file   Stress: Not  "on file   Social Connections: Not on file   Intimate Partner Violence: Not on file   Housing Stability: Not on file       Review of Systems     ROS:  Constitutional: Negative for fatigue and unexpected weight change.   Respiratory: Negative for cough and shortness of breath.    Cardiovascular: Negative for chest pain and palpitations.   Gastrointestinal: Negative for abdominal pain and change in bowel habits  Breasts:  Negative, other than as noted above.   Genitourinary: Negative, other than as noted above.   Psychiatric: Negative for mood difficulties.      Objective      /80 (BP Location: Left arm, Patient Position: Sitting, Cuff Size: Standard)   Ht 5' 3.5\" (1.613 m)   Wt 67.9 kg (149 lb 9.6 oz)   LMP 04/07/2020   BMI 26.08 kg/m²     Physical Examination:    Patient appears well and is not in distress  Neck is supple without masses, no cervical or supraclavicular lymphadenopathy  Cardiovascular: regular rate and rhythm. Grade II systolic murmur  Lungs: clear to auscultation bilaterally; no wheezes  Breasts are symmetrical without mass, tenderness, nipple discharge, skin changes or adenopathy.   Abdomen is soft and nontender without masses.   External genitals are normal without lesions or rashes.  Urethral meatus and urethra are normal  Bladder is normal to palpation  Vagina is normal without discharge or bleeding.   Cervix is normal without discharge or lesion.   Uterus is normal, mobile, nontender without palpable mass.  Adnexa are normal, nontender, without palpable mass.     History and physical exam performed by NURA Miles with direct supervision by me           "

## 2025-01-28 ENCOUNTER — TELEPHONE (OUTPATIENT)
Age: 55
End: 2025-01-28

## 2025-01-28 NOTE — TELEPHONE ENCOUNTER
Cassie MORRELL called asking for the order of the echo requested by Melissa Lacy to be faxed to Fax [phone]

## 2025-02-06 ENCOUNTER — RESULTS FOLLOW-UP (OUTPATIENT)
Dept: OBGYN CLINIC | Facility: MEDICAL CENTER | Age: 55
End: 2025-02-06

## 2025-02-07 NOTE — TELEPHONE ENCOUNTER
----- Message from Sugey Willard RN sent at 2/7/2025 11:45 AM EST -----  Ernie SUN    Clerical Team - please mail letter to patient regarding EKG results and follow recs. Thanks!

## 2025-05-01 ENCOUNTER — HOSPITAL ENCOUNTER (OUTPATIENT)
Dept: RADIOLOGY | Age: 55
Discharge: HOME | End: 2025-05-01
Payer: COMMERCIAL

## 2025-05-01 VITALS — HEIGHT: 64 IN | WEIGHT: 149 LBS | BODY MASS INDEX: 25.44 KG/M2

## 2025-05-01 DIAGNOSIS — Z12.31 ENCOUNTER FOR SCREENING MAMMOGRAM FOR MALIGNANT NEOPLASM OF BREAST: ICD-10-CM

## 2025-05-01 PROCEDURE — 77063 BREAST TOMOSYNTHESIS BI: CPT

## 2025-05-01 PROCEDURE — 77067 SCR MAMMO BI INCL CAD: CPT

## 2025-05-02 ENCOUNTER — RESULTS FOLLOW-UP (OUTPATIENT)
Age: 55
End: 2025-05-02

## 2025-05-17 ENCOUNTER — TELEPHONE (OUTPATIENT)
Dept: MAMMOGRAPHY | Facility: CLINIC | Age: 55
End: 2025-05-17

## 2025-05-17 NOTE — TELEPHONE ENCOUNTER
We were unsuccessful in contacting the above patient for her diagnostic follow up mammogram/ultrasound.  I have left messages for her on 5/2 and 5/9 and 5/12 and 5/17 with no response to schedule.      Please attempt to contact this patient and have them schedule their appointment.  Please let me know if you have any questions or if I can be of any further assistance.    Thank you,  Jesenia Phoenix, ARSENN,RN  Breast Nurse Navigator

## 2025-05-20 ENCOUNTER — TELEPHONE (OUTPATIENT)
Dept: OBGYN CLINIC | Facility: MEDICAL CENTER | Age: 55
End: 2025-05-20

## 2025-05-20 NOTE — TELEPHONE ENCOUNTER
Left VM for patient to call back in regards to scheduling breast imaging. Multiple attempts to reach patient. Please send certified letter for patient to schedule Diagnostic mammogram of the right breast and diagnostic right breast US. Thank you!